# Patient Record
Sex: MALE | Race: OTHER | HISPANIC OR LATINO | Employment: FULL TIME | URBAN - METROPOLITAN AREA
[De-identification: names, ages, dates, MRNs, and addresses within clinical notes are randomized per-mention and may not be internally consistent; named-entity substitution may affect disease eponyms.]

---

## 2024-10-06 ENCOUNTER — OFFICE VISIT (OUTPATIENT)
Dept: URGENT CARE | Facility: CLINIC | Age: 29
End: 2024-10-06

## 2024-10-06 VITALS
BODY MASS INDEX: 25.43 KG/M2 | TEMPERATURE: 98 F | RESPIRATION RATE: 20 BRPM | SYSTOLIC BLOOD PRESSURE: 116 MMHG | DIASTOLIC BLOOD PRESSURE: 75 MMHG | WEIGHT: 162 LBS | OXYGEN SATURATION: 97 % | HEART RATE: 87 BPM | HEIGHT: 67 IN

## 2024-10-06 DIAGNOSIS — S93.402A SPRAIN OF LEFT ANKLE, UNSPECIFIED LIGAMENT, INITIAL ENCOUNTER: Primary | ICD-10-CM

## 2024-10-06 DIAGNOSIS — M25.572 ACUTE LEFT ANKLE PAIN: ICD-10-CM

## 2024-10-06 DIAGNOSIS — T14.90XA TRAUMA: ICD-10-CM

## 2024-10-06 PROCEDURE — 73610 X-RAY EXAM OF ANKLE: CPT | Mod: FY,TIER,LT,S$GLB | Performed by: RADIOLOGY

## 2024-10-06 PROCEDURE — 73630 X-RAY EXAM OF FOOT: CPT | Mod: FY,TIER,LT,S$GLB | Performed by: RADIOLOGY

## 2024-10-06 PROCEDURE — 99214 OFFICE O/P EST MOD 30 MIN: CPT | Mod: TIER,S$GLB,,

## 2024-10-06 RX ORDER — DICLOFENAC SODIUM 50 MG/1
50 TABLET, DELAYED RELEASE ORAL 2 TIMES DAILY
Qty: 20 TABLET | Refills: 0 | Status: SHIPPED | OUTPATIENT
Start: 2024-10-06 | End: 2024-10-16

## 2024-10-06 NOTE — PROGRESS NOTES
"Subjective:      Patient ID: Aramis Huntley is a 29 y.o. male.    Vitals:  height is 5' 7" (1.702 m) and weight is 73.5 kg (162 lb). His oral temperature is 98.3 °F (36.8 °C). His blood pressure is 116/75 and his pulse is 87. His respiration is 20 and oxygen saturation is 97%.     Chief Complaint: Ankle Injury    Pt is a 28 yo male presenting with L ankle pain, swelling.  Onset of symptoms was 6 hours ago after playing soccer. States that he jumped and landed on an inverted foot with audible cracking. Now having swelling, pain, and inability to weight bear. Pt reports using OTC Tylenol, Ibuprofen, and ice without relief.    Ankle Injury   The incident occurred 3 to 6 hours ago. [place] The injury mechanism was a twisting injury. The pain is present in the left ankle. The quality of the pain is described as aching (Throbbing). The pain is at a severity of 6/10. The pain is moderate. The pain has been Intermittent since onset. Associated symptoms include an inability to bear weight. Pertinent negatives include no numbness or tingling. He reports no foreign bodies present. The symptoms are aggravated by movement and weight bearing. He has tried elevation, ice, acetaminophen and NSAIDs for the symptoms. The treatment provided mild relief.       Constitution: Negative for activity change, appetite change, chills, fatigue and fever.   HENT:  Negative for ear pain, congestion, postnasal drip, sinus pain, sinus pressure and sore throat.    Neck: Negative for neck pain and neck swelling.   Cardiovascular:  Negative for chest pain and sob on exertion.   Eyes:  Negative for eye trauma, eye discharge and eye redness.   Respiratory:  Negative for cough, shortness of breath and wheezing.    Gastrointestinal:  Negative for abdominal pain, nausea, vomiting, constipation and diarrhea.   Genitourinary:  Negative for dysuria, frequency, urgency and urine decreased.   Musculoskeletal:  Positive for pain, " trauma, joint swelling, abnormal ROM of joint and pain with walking. Negative for joint pain and muscle ache.   Skin:  Negative for color change, rash, laceration and erythema.   Neurological:  Negative for dizziness, light-headedness and numbness.      Objective:     Physical Exam   Constitutional: He is oriented to person, place, and time. He appears well-developed. He is cooperative.   HENT:   Head: Normocephalic and atraumatic.   Ears:   Right Ear: Hearing and external ear normal.   Left Ear: Hearing and external ear normal.   Nose: Nose normal. No mucosal edema or nasal deformity. No epistaxis. Right sinus exhibits no maxillary sinus tenderness and no frontal sinus tenderness. Left sinus exhibits no maxillary sinus tenderness and no frontal sinus tenderness.   Mouth/Throat: Uvula is midline, oropharynx is clear and moist and mucous membranes are normal. No trismus in the jaw. Normal dentition. No uvula swelling.   Eyes: Conjunctivae and lids are normal.   Neck: Trachea normal and phonation normal. Neck supple.   Cardiovascular: Normal rate.   Abdominal: Normal appearance.   Musculoskeletal:      Left ankle: He exhibits decreased range of motion and swelling. He exhibits no ecchymosis and normal pulse. Tenderness.        Legs:         Feet:    Neurological: He is alert and oriented to person, place, and time. He exhibits normal muscle tone.   Skin: Skin is warm, dry and intact. No erythema   Psychiatric: His speech is normal and behavior is normal. Judgment and thought content normal.   Nursing note and vitals reviewed.    XR ANKLE COMPLETE 3 VIEW LEFT    Result Date: 10/6/2024  EXAMINATION: XR FOOT COMPLETE 3 VIEW LEFT; XR ANKLE COMPLETE 3 VIEW LEFT CLINICAL HISTORY: .  Injury, unspecified, initial encounter TECHNIQUE: AP, lateral, and oblique views of the left ankle. AP, lateral and oblique views of the left foot. COMPARISON: None FINDINGS: Left ankle: No acute fracture or dislocation. Alignment is normal. The  ankle mortise is congruent. The talar dome is intact. Joint spaces are preserved. No effusion. Left foot: No acute fracture or dislocation. Alignment is normal. The Lisfranc articulation is congruent. Joint spaces are preserved.     No acute fracture or dislocation of the ankle or foot. Electronically signed by: Elías Quinones Date:    10/06/2024 Time:    17:43    X-Ray Foot Complete 3 view Left    Result Date: 10/6/2024  EXAMINATION: XR FOOT COMPLETE 3 VIEW LEFT; XR ANKLE COMPLETE 3 VIEW LEFT CLINICAL HISTORY: .  Injury, unspecified, initial encounter TECHNIQUE: AP, lateral, and oblique views of the left ankle. AP, lateral and oblique views of the left foot. COMPARISON: None FINDINGS: Left ankle: No acute fracture or dislocation. Alignment is normal. The ankle mortise is congruent. The talar dome is intact. Joint spaces are preserved. No effusion. Left foot: No acute fracture or dislocation. Alignment is normal. The Lisfranc articulation is congruent. Joint spaces are preserved.     No acute fracture or dislocation of the ankle or foot. Electronically signed by: Elías Quinones Date:    10/06/2024 Time:    17:43     Assessment:     1. Sprain of left ankle, unspecified ligament, initial encounter    2. Trauma    3. Acute left ankle pain        Plan:   I have reviewed the patient chart and pertinent past imaging/labs.      Sprain of left ankle, unspecified ligament, initial encounter  -     diclofenac (VOLTAREN) 50 MG EC tablet; Take 1 tablet (50 mg total) by mouth 2 (two) times daily. for 10 days  Dispense: 20 tablet; Refill: 0    Trauma  -     XR ANKLE COMPLETE 3 VIEW LEFT; Future; Expected date: 10/06/2024  -     X-Ray Foot Complete 3 view Left; Future; Expected date: 10/06/2024    Acute left ankle pain

## 2024-10-06 NOTE — PATIENT INSTRUCTIONS
Pain: Diclofenac twice daily as needed. May take with Tylenol. Avoid Ibuprofen, Advil, Aleve  Wrap ankle for compression  Elevate leg  Weight bear as tolerated  Ice therapy as tolerated  Please drink plenty of fluids.  Please get plenty of rest.  Please return here or go to the Emergency Department for any concerns or worsening of condition.  If you were prescribed a narcotic medication, do not drive or operate heavy equipment or machinery while taking these medications.  If you were not prescribed an anti-inflammatory medication, and if you do not have any history of stomach/intestinal ulcers, or kidney disease, or are not taking a blood thinner such as Coumadin, Plavix, Pradaxa, Eloquis, or Xaralta for example, it is OK to take over the counter Ibuprofen or Advil or Motrin or Aleve as directed.  Do not take these medications on an empty stomach.  Rest, ice, compression and elevation to the affected joint or limb as needed.  Please follow up with your primary care doctor or specialist as needed.    If you  smoke, please stop smoking.

## 2024-10-06 NOTE — LETTER
October 6, 2024      [facility] [address]  [place] [address]  Phone: [phone]  Fax: [phone]       Patient: Aramis Huntley   YOB: 1995  Date of Visit: 10/06/2024    To Whom It May Concern:    Patti Huntley  was at [facility] on 10/06/2024. The patient may return to work/school on Wednesday October 9th, 2024 or sooner with restrictions. Patient should avoid weight bearing until tolerated. Patient able to use crutches as needed. If you have any questions or concerns, or if I can be of further assistance, please do not hesitate to contact me.    Sincerely,          Behzad Pop PA-C